# Patient Record
Sex: MALE | Race: BLACK OR AFRICAN AMERICAN | NOT HISPANIC OR LATINO | URBAN - METROPOLITAN AREA
[De-identification: names, ages, dates, MRNs, and addresses within clinical notes are randomized per-mention and may not be internally consistent; named-entity substitution may affect disease eponyms.]

---

## 2019-10-28 ENCOUNTER — EMERGENCY (EMERGENCY)
Facility: HOSPITAL | Age: 59
LOS: 1 days | Discharge: HOME | End: 2019-10-28
Attending: EMERGENCY MEDICINE
Payer: COMMERCIAL

## 2019-10-28 VITALS
HEART RATE: 89 BPM | DIASTOLIC BLOOD PRESSURE: 89 MMHG | HEIGHT: 70 IN | OXYGEN SATURATION: 100 % | SYSTOLIC BLOOD PRESSURE: 152 MMHG | TEMPERATURE: 98 F | RESPIRATION RATE: 16 BRPM | WEIGHT: 166.89 LBS

## 2019-10-28 PROCEDURE — 99283 EMERGENCY DEPT VISIT LOW MDM: CPT

## 2019-10-28 PROCEDURE — 99283 EMERGENCY DEPT VISIT LOW MDM: CPT | Mod: 25

## 2019-10-28 PROCEDURE — 96372 THER/PROPH/DIAG INJ SC/IM: CPT

## 2019-10-28 RX ORDER — METHOCARBAMOL 500 MG/1
1 TABLET, FILM COATED ORAL
Qty: 30 | Refills: 0
Start: 2019-10-28

## 2019-10-28 RX ORDER — KETOROLAC TROMETHAMINE 30 MG/ML
60 SYRINGE (ML) INJECTION ONCE
Refills: 0 | Status: DISCONTINUED | OUTPATIENT
Start: 2019-10-28 | End: 2019-10-28

## 2019-10-28 RX ORDER — IBUPROFEN 200 MG
1 TABLET ORAL
Qty: 30 | Refills: 0
Start: 2019-10-28

## 2019-10-28 RX ORDER — DIAZEPAM 5 MG
5 TABLET ORAL ONCE
Refills: 0 | Status: DISCONTINUED | OUTPATIENT
Start: 2019-10-28 | End: 2019-10-28

## 2019-10-28 RX ORDER — LIDOCAINE 4 G/100G
1 CREAM TOPICAL ONCE
Refills: 0 | Status: COMPLETED | OUTPATIENT
Start: 2019-10-28 | End: 2019-10-28

## 2019-10-28 RX ADMIN — Medication 5 MILLIGRAM(S): at 23:14

## 2019-10-28 RX ADMIN — Medication 60 MILLIGRAM(S): at 23:15

## 2019-10-28 RX ADMIN — LIDOCAINE 1 PATCH: 4 CREAM TOPICAL at 23:14

## 2019-10-28 NOTE — ED PROVIDER NOTE - PATIENT PORTAL LINK FT
You can access the FollowMyHealth Patient Portal offered by [facility] by registering at the following website: [email or web address]. By joining [employer]’s FollowMyHealth portal, you will also be able to view your health information using other applications (apps) compatible with our system.

## 2019-10-28 NOTE — ED PROVIDER NOTE - PROGRESS NOTE DETAILS
David DO: Pt feels better, no focal deficits, no mal gait noted. Pt is now very eager to be discharged. Pt is well appearing walking with normal gait, stable for discharge and follow up with medical doctor. Pt educated on care and need for follow up. Discussed anticipatory guidance and return precautions. Questions answered. I had a detailed discussion with the patient and/or guardian regarding the historical points, exam findings, and any diagnostic results supporting the discharge diagnosis.
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
## 2019-10-28 NOTE — ED PROVIDER NOTE - NSFOLLOWUPCLINICS_GEN_ALL_ED_FT
[facility]  [facility]  [address] - [address] [place] [address]  Phone: [phone]  Fax: [phone]  Follow Up Time:

## 2019-10-29 RX ADMIN — Medication 60 MILLIGRAM(S): at 00:06

## 2020-01-09 NOTE — ED ADULT NURSE NOTE - NSFALLRSKASSESSTYPE_ED_ALL_ED
Initial (On Arrival)
How Severe Is Your Cyst?: mild
Is This A New Presentation, Or A Follow-Up?: Cyst
Additional History: Patient states inflammation, pain, and drainage all started within the last month.

## 2024-04-15 NOTE — ED PROVIDER NOTE - WORK/EXCUSE FORM DATE
31-Oct-2019

## 2025-01-24 NOTE — ED ADULT TRIAGE NOTE - HEIGHT IN INCHES
69M PMH (per chart review) EtOH use disorder, depression, former smoker (20 pack year quit 15 years ago), HTN, hypothyroid, CAD, diabetes who was BIBEMS to [facility] from shelter for SOB, cough, fevers, and malaise, initially admitted to MICU in septic shock 2/2 influenza now improved. course complicated by dizziness, found to have subacute/chronic infacts on MRI, now optimized by neuro and pending TARYN. 10